# Patient Record
Sex: FEMALE | Race: WHITE | NOT HISPANIC OR LATINO | URBAN - METROPOLITAN AREA
[De-identification: names, ages, dates, MRNs, and addresses within clinical notes are randomized per-mention and may not be internally consistent; named-entity substitution may affect disease eponyms.]

---

## 2020-07-21 ENCOUNTER — OFFICE VISIT - RIVER FALLS (OUTPATIENT)
Dept: FAMILY MEDICINE | Facility: CLINIC | Age: 35
End: 2020-07-21

## 2020-07-21 ASSESSMENT — MIFFLIN-ST. JEOR: SCORE: 1275.09

## 2022-02-11 VITALS
HEIGHT: 65 IN | SYSTOLIC BLOOD PRESSURE: 100 MMHG | HEART RATE: 60 BPM | WEIGHT: 131 LBS | RESPIRATION RATE: 16 BRPM | TEMPERATURE: 99 F | DIASTOLIC BLOOD PRESSURE: 62 MMHG | BODY MASS INDEX: 21.83 KG/M2

## 2022-02-16 NOTE — NURSING NOTE
Depression Screening Entered On:  7/21/2020 1:30 PM CDT    Performed On:  7/21/2020 1:30 PM CDT by Hugo Cabrera CMA               Depression Screening   Little Interest - Pleasure in Activities :   Not at all   Feeling Down, Depressed, Hopeless :   Not at all   Initial Depression Screen Score :   0 Score   Poor Appetite or Overeating :   Not at all   Trouble Falling or Staying Asleep :   Several days   Feeling Tired or Little Energy :   Not at all   Feeling Bad About Yourself :   Not at all   Trouble Concentrating :   Not at all   Moving or Speaking Slowly :   Not at all   Thoughts Better Off Dead or Hurting Self :   Not at all   SHANDA Difficulty with Work, Home, Others :   Not difficult at all   Detailed Depression Screen Score :   1    Total Depression Screen Score :   1    Hugo Cabrera CMA - 7/21/2020 1:30 PM CDT

## 2022-02-16 NOTE — NURSING NOTE
Comprehensive Intake Entered On:  7/21/2020 11:41 AM CDT    Performed On:  7/21/2020 11:33 AM CDT by Hugo Cabrera CMA               Summary   Chief Complaint :   New pt here for [facility].   Weight Measured :   131 lb(Converted to: 131 lb 0 oz, 59.42 kg)    Height Measured :   65 in(Converted to: 5 ft 5 in, 165.10 cm)    Body Mass Index :   21.8 kg/m2   Body Surface Area :   1.65 m2   Systolic Blood Pressure :   100 mmHg   Diastolic Blood Pressure :   62 mmHg   Mean Arterial Pressure :   75 mmHg   Peripheral Pulse Rate :   60 bpm   BP Site :   Right arm   Pulse Site :   Radial artery   Temperature Tympanic :   99 DegF(Converted to: 37.2 DegC)    Respiratory Rate :   16 br/min   Hugo Cabrera CMA - 7/21/2020 11:33 AM CDT   Health Status   Allergies Verified? :   Yes   Medication History Verified? :   Yes   Medical History Verified? :   Yes   Pre-Visit Planning Status :   Not completed   Tobacco Use? :   Former smoker   Hugo Cabrera CMA - 7/21/2020 11:33 AM CDT   Meds / Allergies   (As Of: 7/21/2020 11:41:47 AM CDT)   Allergies (Active)   Zithromax  Estimated Onset Date:   Unspecified ; Created By:   Hugo Cabrera CMA; Reaction Status:   Active ; Category:   Drug ; Substance:   Zithromax ; Type:   Allergy ; Updated By:   Hugo Cabrera CMA; Reviewed Date:   7/21/2020 11:36 AM CDT        Medication List   (As Of: 7/21/2020 11:41:47 AM CDT)        ID Risk Screen   Recent Travel History :   No recent travel   Family Member Travel History :   No recent travel   Other Exposure to Infectious Disease :   Unknown   Hugo Cabrera CMA - 7/21/2020 11:33 AM CDT   Procedures / Surgeries        -    Procedure History   (As Of: 7/21/2020 11:41:48 AM CDT)     Anesthesia Minutes:   0 ; Procedure Name:   History of tonsillectomy ; Procedure Minutes:   0 ; Last Reviewed Dt/Tm:   7/21/2020 11:38:08 AM CDT            Anesthesia Minutes:   0 ; Procedure Name:   Extraction of wisdom tooth ; Procedure Minutes:   0 ; Last  Reviewed Dt/Tm:   7/21/2020 11:38:15 AM CDT            Family History   Family History   (As Of: 7/21/2020 11:41:48 AM CDT)   Mother:   Relation:   Mother ; Gender:   Female ;           Nomenclature:   Fibromyalgia..... ; Value:   Positive            Nomenclature:   Depression ; Value:   Positive          Father:   Relation:   Father ; Gender:   Male ;           Nomenclature:   CA - Cancer of colon ; Value:   Positive            Social History   Social History   (As Of: 7/21/2020 11:41:48 AM CDT)   Alcohol:        Current   (Last Updated: 7/21/2020 11:37:25 AM CDT by Hugo Cabrera CMA)          Tobacco:  Past      Former smoker, quit more than 30 days ago   Comments:  7/21/2020 11:37 AM - Hugo Cabrera CMA: Pt quit 6/29/20   (Last Updated: 7/21/2020 11:37:21 AM CDT by Hugo Cabrera CMA)          Substance Abuse:        Never   (Last Updated: 7/21/2020 11:37:38 AM CDT by Hugo Cabrera CMA)

## 2022-02-16 NOTE — NURSING NOTE
CAGE Assessment Entered On:  7/21/2020 1:30 PM CDT    Performed On:  7/21/2020 1:30 PM CDT by Hugo Cabrera CMA               Assessment   Have you ever felt you should cut down on your drinking :   Yes   Have people annoyed you by criticizing your drinking :   No   Have you ever felt bad or guilty about your drinking :   No   Have you ever taken a drink first thing in the morning to steady your nerves or get rid of a hangover (Eye-opener) :   No   CAGE Score :   1    Hugo Cabrera CMA - 7/21/2020 1:30 PM CDT

## 2022-02-16 NOTE — PROGRESS NOTES
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        Histories   Past Medical History:    Resolved  H/O: chickenpox ([id number]):  Resolved.   Family History:    CA - Cancer of colon  Father  Depression  Mother  Fibromyalgia.....  Mother     Procedure history:    History of tonsillectomy (2316869082).  Extraction of wisdom tooth ([id number]).   Social History:        Alcohol Assessment            Current      Tobacco Assessment: Past            Former smoker, quit more than 30 days ago                     Comments:                      2020 - Hugo Cabrera CMA                     Pt quit 18      Substance Abuse Assessment            Never        Objective       Vital Signs   2020 11:33 AM CDT Temperature Tympanic 99 DegF    Peripheral Pulse Rate 60 bpm    Pulse Site Radial artery    Respiratory Rate 16 br/min    Systolic Blood Pressure 100 mmHg    Diastolic Blood Pressure 62 mmHg    Mean Arterial Pressure 75 mmHg    BP Site Right arm      Measurements from flowsheet : Measurements   2020 11:33 AM CDT Height Measured - Standard 65 in    Weight Measured - Standard 131 lb     BSA 1.65 m2    Body Mass Index 21.8 kg/m2      General:  No acute distress.    Eye:  Pupils are equal, round and reactive to light, Extraocular movements are intact.    HENT:  Tympanic membranes are clear, No pharyngeal erythema, No sinus tenderness.    Neck:  Supple, Non-tender, No lymphadenopathy.    Respiratory:  Lungs are clear to auscultation.    Cardiovascular:  Normal rate, Regular rhythm, No murmur.    Gastrointestinal:  Soft, Non-tender, Non-distended, Normal bowel sounds, No organomegaly.    Musculoskeletal:  Normal range of motion.    Neurologic:  Cranial Nerves II-XII are grossly intact, Normal deep tendon reflexes.    Psychiatric:  Appropriate mood & affect.       Results Review   General results      Plan   Impression and Plan:     Diagnosis     Pre-employment examination ([id number] Z02.1).     .    Condition Form completed and signed.  Approved without restrictions   , and no risk factors for TB.    Orders     Orders   Charges (Evaluation and Management):  [id number] initial preventive medicine new pt age 18-39yrs (Charge) (Order): Quantity: 1, Pre-employment examination.     .